# Patient Record
Sex: MALE | Race: AMERICAN INDIAN OR ALASKA NATIVE | URBAN - METROPOLITAN AREA
[De-identification: names, ages, dates, MRNs, and addresses within clinical notes are randomized per-mention and may not be internally consistent; named-entity substitution may affect disease eponyms.]

---

## 2022-04-20 ENCOUNTER — OFFICE VISIT (OUTPATIENT)
Dept: URBAN - METROPOLITAN AREA CLINIC 60 | Facility: CLINIC | Age: 62
End: 2022-04-20
Payer: COMMERCIAL

## 2022-04-20 DIAGNOSIS — H00.024 HORDEOLUM INTERNUM LEFT UPPER LID: ICD-10-CM

## 2022-04-20 DIAGNOSIS — H00.021 HORDEOLUM INTERNUM RIGHT UPPER LID: ICD-10-CM

## 2022-04-20 DIAGNOSIS — H04.123 TEAR FILM INSUFFICIENCY OF BILATERAL LACRIMAL GLANDS: ICD-10-CM

## 2022-04-20 DIAGNOSIS — B88.0 DERMATITIS DUE TO DEMODEX SPECIES: Primary | ICD-10-CM

## 2022-04-20 PROCEDURE — 99203 OFFICE O/P NEW LOW 30 MIN: CPT | Performed by: OPTOMETRIST

## 2022-04-20 RX ORDER — PREDNISOLONE ACETATE 10 MG/ML
1 % SUSPENSION/ DROPS OPHTHALMIC
Qty: 5 | Refills: 0 | Status: ACTIVE
Start: 2022-04-20

## 2022-04-20 NOTE — IMPRESSION/PLAN
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 Patient educated on findings. Recommend patient obtain a cleanser that has tea tree oil. Patient given information on we love eyes foam cleanser.

## 2022-04-20 NOTE — IMPRESSION/PLAN
Impression: Tear film insufficiency of bilateral lacrimal glands: H04.123. Plan: Patient educated on diagnosis. Lissamine green staining done today, negative OU. Will start patient on Pred Forte TID OU, erx'd to patient's pharmacy. Patient to use Pred until bottle is done. If symptoms improve with Pred, will consider starting him on a prescription dry eye drop. Also discussed option of punctal plugs. Return in one month for a follow up.

## 2022-04-20 NOTE — IMPRESSION/PLAN
Impression: Hordeolum internum right upper lid: H00.021. Plan: Patient educated on diagnosis. Recommend patient perform warm compresses.

## 2022-05-17 ENCOUNTER — OFFICE VISIT (OUTPATIENT)
Dept: URBAN - METROPOLITAN AREA CLINIC 60 | Facility: CLINIC | Age: 62
End: 2022-05-17
Payer: COMMERCIAL

## 2022-05-17 DIAGNOSIS — H52.13 MYOPIA, BILATERAL: ICD-10-CM

## 2022-05-17 DIAGNOSIS — B88.0 DERMATITIS DUE TO DEMODEX SPECIES: ICD-10-CM

## 2022-05-17 DIAGNOSIS — H04.123 TEAR FILM INSUFFICIENCY OF BILATERAL LACRIMAL GLANDS: Primary | ICD-10-CM

## 2022-05-17 PROCEDURE — 99213 OFFICE O/P EST LOW 20 MIN: CPT | Performed by: OPTOMETRIST

## 2022-05-17 ASSESSMENT — VISUAL ACUITY
OD: 20/25
OS: 20/30

## 2022-05-17 NOTE — IMPRESSION/PLAN
Impression: Tear film insufficiency of bilateral lacrimal glands: H04.123. Plan: Diagnosis discussed with patient. Recommend patient continue with artificial tears and finish with Pred Forte OU.

## 2022-05-17 NOTE — IMPRESSION/PLAN
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 Patient educated on findings. Recommend patient continue with tea tree oil cleanser as needed for preventative measures.